# Patient Record
Sex: FEMALE | Race: WHITE | Employment: OTHER | URBAN - NONMETROPOLITAN AREA
[De-identification: names, ages, dates, MRNs, and addresses within clinical notes are randomized per-mention and may not be internally consistent; named-entity substitution may affect disease eponyms.]

---

## 2023-10-05 NOTE — PROGRESS NOTES
[address]  [address] [facility] [address]  Dept: [phone]  Loc: [phone]   Hematology/Oncology Consult (Clinic)        Monica Juarezvale   1965     No ref. provider found   Cheli Small MD     Reason:  breast cancer   Chief Complaint   Patient presents with    New Patient     Encapsulated papillary carcinoma      HPI:  63year old female with recently diagnosed breast cancer presents to the clinic to establish care. Patient underwent left breast lumpectomy without lymph node dissection on 8/22/2023. Pathology showed invasive ductal carcinoma, grade 1, 7 mm in greatest dimension, in association with encapsulated papillary carcinoma and conventional type ductal carcinoma in situ, cribriform and papillary types, low intermediate nuclear grade, margins widely free. ER was 99%, VT 99% and HER2 negative. Patient has been recovering well since surgery however she has noticed redness in the left breast for the last couple weeks. Denies having any pain or tenderness. Does not feel more warm. Denies having other symptoms. Patient had menopause about 4 to 5 years ago. Patient does not want to take any antihormonal pills and wants to try natural methods to decrease her hormones.    -Allergies and medications, past medical history, past surgical history, family history, and social history reviewed. ROS:  14 point system ROS was done and negative except for the positive pertinent history noted in subjective/ HPI. Allergies:   Allergies   Allergen Reactions    Penicillins         Medications:  Current Outpatient Medications   Medication Sig Dispense Refill    furosemide (LASIX) 40 MG tablet Take 1 tablet by mouth daily      losartan (COZAAR) 25 MG tablet Take 0.5 tablets by mouth daily      metoprolol succinate (TOPROL XL) 50 MG extended release tablet Take 1 tablet by mouth daily      potassium chloride (MICRO-K)

## 2023-10-09 ENCOUNTER — OFFICE VISIT (OUTPATIENT)
Dept: ONCOLOGY | Age: 58
End: 2023-10-09
Payer: MEDICAID

## 2023-10-09 ENCOUNTER — HOSPITAL ENCOUNTER (OUTPATIENT)
Dept: INFUSION THERAPY | Age: 58
Discharge: HOME | End: 2023-10-09
Payer: MEDICAID

## 2023-10-09 VITALS
TEMPERATURE: 98.4 F | OXYGEN SATURATION: 96 % | SYSTOLIC BLOOD PRESSURE: 159 MMHG | HEART RATE: 77 BPM | RESPIRATION RATE: 16 BRPM | DIASTOLIC BLOOD PRESSURE: 86 MMHG

## 2023-10-09 VITALS
SYSTOLIC BLOOD PRESSURE: 159 MMHG | RESPIRATION RATE: 16 BRPM | TEMPERATURE: 98.4 F | OXYGEN SATURATION: 96 % | HEART RATE: 77 BPM | DIASTOLIC BLOOD PRESSURE: 86 MMHG

## 2023-10-09 DIAGNOSIS — C50.912 BREAST CANCER, STAGE 1, ESTROGEN RECEPTOR POSITIVE, LEFT (HCC): Primary | ICD-10-CM

## 2023-10-09 DIAGNOSIS — Z17.0 BREAST CANCER, STAGE 1, ESTROGEN RECEPTOR POSITIVE, LEFT (HCC): ICD-10-CM

## 2023-10-09 DIAGNOSIS — C50.912 BREAST CANCER, STAGE 1, ESTROGEN RECEPTOR POSITIVE, LEFT (HCC): ICD-10-CM

## 2023-10-09 DIAGNOSIS — Z17.0 BREAST CANCER, STAGE 1, ESTROGEN RECEPTOR POSITIVE, LEFT (HCC): Primary | ICD-10-CM

## 2023-10-09 LAB
ALBUMIN SERPL BCG-MCNC: 3.7 G/DL (ref 3.5–5.1)
ALP SERPL-CCNC: 202 U/L (ref 38–126)
ALT SERPL W/O P-5'-P-CCNC: 68 U/L (ref 11–66)
ANION GAP SERPL CALC-SCNC: 11 MEQ/L (ref 8–16)
AST SERPL-CCNC: 58 U/L (ref 5–40)
BASOPHILS ABSOLUTE: 0 THOU/MM3 (ref 0–0.1)
BASOPHILS NFR BLD AUTO: 0.2 %
BILIRUB SERPL-MCNC: 0.6 MG/DL (ref 0.3–1.2)
BUN SERPL-MCNC: 14 MG/DL (ref 7–22)
CALCIUM SERPL-MCNC: 9.7 MG/DL (ref 8.5–10.5)
CHLORIDE SERPL-SCNC: 103 MEQ/L (ref 98–111)
CO2 SERPL-SCNC: 26 MEQ/L (ref 23–33)
CREAT SERPL-MCNC: 1 MG/DL (ref 0.4–1.2)
DEPRECATED RDW RBC AUTO: 46.4 FL (ref 35–45)
EOSINOPHIL NFR BLD AUTO: 4.1 %
EOSINOPHILS ABSOLUTE: 0.4 THOU/MM3 (ref 0–0.4)
ERYTHROCYTE [DISTWIDTH] IN BLOOD BY AUTOMATED COUNT: 12.6 % (ref 11.5–14.5)
GFR SERPL CREATININE-BSD FRML MDRD: > 60 ML/MIN/1.73M2
GLUCOSE SERPL-MCNC: 92 MG/DL (ref 70–108)
HCT VFR BLD AUTO: 49 % (ref 37–47)
HGB BLD-MCNC: 15.8 GM/DL (ref 12–16)
IMM GRANULOCYTES # BLD AUTO: 0.04 THOU/MM3 (ref 0–0.07)
IMM GRANULOCYTES NFR BLD AUTO: 0.4 %
LYMPHOCYTES ABSOLUTE: 2.8 THOU/MM3 (ref 1–4.8)
LYMPHOCYTES NFR BLD AUTO: 30.3 %
MCH RBC QN AUTO: 32 PG (ref 26–33)
MCHC RBC AUTO-ENTMCNC: 32.2 GM/DL (ref 32.2–35.5)
MCV RBC AUTO: 99.2 FL (ref 81–99)
MONOCYTES ABSOLUTE: 0.8 THOU/MM3 (ref 0.4–1.3)
MONOCYTES NFR BLD AUTO: 8.4 %
NEUTROPHILS NFR BLD AUTO: 56.6 %
NRBC BLD AUTO-RTO: 0 /100 WBC
PLATELET # BLD AUTO: 326 THOU/MM3 (ref 130–400)
PMV BLD AUTO: 10.5 FL (ref 9.4–12.4)
POTASSIUM SERPL-SCNC: 4.1 MEQ/L (ref 3.5–5.2)
PROT SERPL-MCNC: 7.3 G/DL (ref 6.1–8)
RBC # BLD AUTO: 4.94 MILL/MM3 (ref 4.2–5.4)
SEGMENTED NEUTROPHILS ABSOLUTE COUNT: 5.3 THOU/MM3 (ref 1.8–7.7)
SODIUM SERPL-SCNC: 140 MEQ/L (ref 135–145)
WBC # BLD AUTO: 9.3 THOU/MM3 (ref 4.8–10.8)

## 2023-10-09 PROCEDURE — 80053 COMPREHEN METABOLIC PANEL: CPT

## 2023-10-09 PROCEDURE — 99211 OFF/OP EST MAY X REQ PHY/QHP: CPT

## 2023-10-09 PROCEDURE — 85025 COMPLETE CBC W/AUTO DIFF WBC: CPT

## 2023-10-09 PROCEDURE — 99204 OFFICE O/P NEW MOD 45 MIN: CPT | Performed by: INTERNAL MEDICINE

## 2023-10-09 RX ORDER — METOPROLOL SUCCINATE 50 MG/1
50 TABLET, EXTENDED RELEASE ORAL DAILY
COMMUNITY
Start: 2023-08-07

## 2023-10-09 RX ORDER — ALBUTEROL SULFATE 90 UG/1
AEROSOL, METERED RESPIRATORY (INHALATION)
COMMUNITY
Start: 2023-09-08

## 2023-10-09 RX ORDER — FUROSEMIDE 40 MG/1
40 TABLET ORAL DAILY
COMMUNITY
Start: 2023-08-07

## 2023-10-09 RX ORDER — POTASSIUM CHLORIDE 750 MG/1
10 CAPSULE, EXTENDED RELEASE ORAL 2 TIMES DAILY
COMMUNITY
Start: 2023-02-03

## 2023-10-09 RX ORDER — LOSARTAN POTASSIUM 25 MG/1
12.5 TABLET ORAL DAILY
COMMUNITY
Start: 2023-02-04

## 2023-10-09 NOTE — PATIENT INSTRUCTIONS
Please discuss with surgery again about lymph node biopsy and redness in the breast.   Please see radiation oncology. Genetic testing today.

## 2023-10-31 LAB
Lab: NEGATIVE
Lab: NORMAL

## 2023-11-19 NOTE — PROGRESS NOTES
[address] [facility] [address]  Dept: [phone]  Loc: [phone]   Hematology/Oncology Progress (Clinic)        Agnes Mccabe   1965     No ref. provider found   Andrew Maier MD     Reason:  breast cancer   No chief complaint on file. HPI:  63year old female with recently diagnosed breast cancer presents to the clinic to establish care. Patient underwent left breast lumpectomy without lymph node dissection on 8/22/2023. Pathology showed invasive ductal carcinoma, grade 1, 7 mm in greatest dimension, in association with encapsulated papillary carcinoma and conventional type ductal carcinoma in situ, cribriform and papillary types, low intermediate nuclear grade, margins widely free. ER was 99%, MA 99% and HER2 negative. Patient has been recovering well since surgery however she has noticed redness in the left breast for the last couple weeks. Denies having any pain or tenderness. Does not feel more warm. Denies having other symptoms. Patient had menopause about 4 to 5 years ago. Patient does not want to take any antihormonal pills and wants to try natural methods to decrease her hormones. 11/20/23-  Patient is doing better now. Left breast has improved after completing a course of antibiotics. Denies any redness or swelling anymore. She had sentinel lymph node biopsy done recently which was negative. Denies other symptoms currently.    -Allergies and medications, past medical history, past surgical history, family history, and social history reviewed. ROS:  14 point system ROS was done and negative except for the positive pertinent history noted in subjective/ HPI. Allergies:   Allergies   Allergen Reactions    Penicillins         Medications:  Current Outpatient Medications   Medication Sig Dispense Refill    albuterol sulfate HFA (PROVENTIL;VENTOLIN;PROAIR) 108 (90 Base)

## 2023-11-20 ENCOUNTER — HOSPITAL ENCOUNTER (OUTPATIENT)
Dept: INFUSION THERAPY | Age: 58
Discharge: HOME | End: 2023-11-20
Payer: MEDICAID

## 2023-11-20 ENCOUNTER — OFFICE VISIT (OUTPATIENT)
Dept: ONCOLOGY | Age: 58
End: 2023-11-20
Payer: MEDICAID

## 2023-11-20 VITALS
BODY MASS INDEX: 45.99 KG/M2 | SYSTOLIC BLOOD PRESSURE: 151 MMHG | HEART RATE: 101 BPM | DIASTOLIC BLOOD PRESSURE: 76 MMHG | HEIGHT: 67 IN | OXYGEN SATURATION: 94 % | WEIGHT: 293 LBS | RESPIRATION RATE: 20 BRPM

## 2023-11-20 VITALS
OXYGEN SATURATION: 94 % | RESPIRATION RATE: 20 BRPM | WEIGHT: 293 LBS | HEART RATE: 101 BPM | DIASTOLIC BLOOD PRESSURE: 76 MMHG | HEIGHT: 67 IN | BODY MASS INDEX: 45.99 KG/M2 | SYSTOLIC BLOOD PRESSURE: 151 MMHG

## 2023-11-20 DIAGNOSIS — Z17.0 BREAST CANCER, STAGE 1, ESTROGEN RECEPTOR POSITIVE, LEFT (HCC): Primary | ICD-10-CM

## 2023-11-20 DIAGNOSIS — C50.912 BREAST CANCER, STAGE 1, ESTROGEN RECEPTOR POSITIVE, LEFT (HCC): Primary | ICD-10-CM

## 2023-11-20 PROCEDURE — 99211 OFF/OP EST MAY X REQ PHY/QHP: CPT

## 2023-11-20 PROCEDURE — 99213 OFFICE O/P EST LOW 20 MIN: CPT | Performed by: INTERNAL MEDICINE

## 2023-11-29 ENCOUNTER — INITIAL CONSULT (OUTPATIENT)
Dept: RADIATION ONCOLOGY | Age: 58
End: 2023-11-29

## 2023-11-29 VITALS
WEIGHT: 293 LBS | DIASTOLIC BLOOD PRESSURE: 89 MMHG | SYSTOLIC BLOOD PRESSURE: 178 MMHG | HEART RATE: 100 BPM | BODY MASS INDEX: 47.09 KG/M2 | HEIGHT: 66 IN | OXYGEN SATURATION: 98 % | RESPIRATION RATE: 20 BRPM | TEMPERATURE: 97.2 F

## 2023-11-29 DIAGNOSIS — Z17.0 MALIGNANT NEOPLASM OF UPPER-OUTER QUADRANT OF LEFT BREAST IN FEMALE, ESTROGEN RECEPTOR POSITIVE (HCC): Primary | ICD-10-CM

## 2023-11-29 DIAGNOSIS — C50.412 MALIGNANT NEOPLASM OF UPPER-OUTER QUADRANT OF LEFT BREAST IN FEMALE, ESTROGEN RECEPTOR POSITIVE (HCC): Primary | ICD-10-CM

## 2023-11-29 NOTE — PROGRESS NOTES
pulmonary edema or small airways disease. 3. Left heart enlargement. 4. 2.6 cm nodular density in the left breast.         02/01/2023: CT abdomen pelvis wo contrast:  IMPRESSION:   1. Moderate anasarca is noted in the abdominal wall. Otherwise, no acute   abdominal or pelvic disease. 2.  Mild cardiomegaly. 3.  A few small gallstones are present within the gallbladder. 4.  Nonvisualization of the appendix. 5.  There is advanced degenerative disc disease from L2 through L5.         01/14/2023: CT abdomen pelvis wo contrast:  IMPRESSION:   1. Cholelithiasis. Questionable minimal hazy density adjacent. Could simply represent some motion artifact. Mild inflammatory changes such as in cholecystitis cannot be entirely excluded as the gallbladder is incompletely evaluated by CT. Clinical   correlation suggested with further/follow-up evaluation as clinically indicated. 2. Partially visualized 1.9 cm intermediate density focus associated with the left breast soft tissue. Incompletely evaluated by CT and incompletely visualized. Could simply represent glandular tissue but soft tissue lesion/mass not excluded. Clinically   correlate with any previous workup. Otherwise, consider follow-up/further evaluation with mammography or ultrasound, as clinically indicated. 3. Possible 1.6 cm left adrenal nodule may represent adenoma. Review of Systems   Constitutional:  Negative for activity change, appetite change, fatigue and unexpected weight change. HENT:  Negative for ear pain and trouble swallowing. Eyes:         Hx glacoma   Respiratory:  Positive for shortness of breath (with activity). Negative for cough. Cardiovascular:  Negative for chest pain and leg swelling. Gastrointestinal:  Negative for abdominal pain, blood in stool, diarrhea, nausea and rectal pain. Genitourinary:  Negative for dysuria, frequency, hematuria and urgency.    Musculoskeletal:  Positive for back pain (arthritis joaquina

## 2023-12-01 ENCOUNTER — SOCIAL WORK (OUTPATIENT)
Dept: INFUSION THERAPY | Age: 58
End: 2023-12-01

## 2023-12-01 NOTE — PROGRESS NOTES
Oncology Social Work    Date: 12/1/2023  Time: 2:18 PM  Name: Leah Rosario  MRN: [id number]     Contact Type: Follow-up    Note:   Situation: This staff called Leah Rosario via phone support to introduce myself as her Oncology Social Worker. Background:  Michelle German had her recent appointment at the [facility]. This staff was calling to review if she had any outstanding concerns. Assessment: : This staff had the opportunity to speak with Michelle German. She appeared distracted as she explained that she was driving while we were talking. She asked for gas cards to assist in her opportunity to come to the [address] [place] [address] for her treatment. This staff offered assistance through her Medicaid benefit, however she denied the support, stating she wants to drive herself and just wanted gas assistance. - Education regarding the services provided by the SW were explained during our conversation. I shared the responsibility of utilizing available support services and shared the information from the [employer] [facility], [facility]. This staff encouraged her to contact them for additional support when she has a chance. - No referrals were made at this time since her county has limited cancer resources available for her support. Referral services may be reconsidered should his needs regarding assistance change. Recommendation: Follow-up will be initiated by Michelle eGrman based on need.  provided her with my contact information and will remain available for support.         ALMITA Giles, HEMA, MIROSLAVA  Oncology Social Worker      Electronically signed by ALMITA Giles LSW, ACHP-SW on 12/1/2023 at 2:18 PM

## 2023-12-17 PROBLEM — C50.412 MALIGNANT NEOPLASM OF UPPER-OUTER QUADRANT OF LEFT FEMALE BREAST (HCC): Status: ACTIVE | Noted: 2023-12-17

## 2023-12-22 ENCOUNTER — TELEPHONE (OUTPATIENT)
Dept: SPIRITUAL SERVICES | Facility: CLINIC | Age: 58
End: 2023-12-22

## 2024-01-22 ENCOUNTER — HOSPITAL ENCOUNTER (OUTPATIENT)
Dept: INFUSION THERAPY | Age: 59
Discharge: HOME | End: 2024-01-22
Payer: MEDICAID

## 2024-01-22 ENCOUNTER — OFFICE VISIT (OUTPATIENT)
Dept: ONCOLOGY | Age: 59
End: 2024-01-22
Payer: MEDICAID

## 2024-01-22 VITALS
BODY MASS INDEX: 47.09 KG/M2 | SYSTOLIC BLOOD PRESSURE: 154 MMHG | RESPIRATION RATE: 20 BRPM | OXYGEN SATURATION: 96 % | WEIGHT: 293 LBS | DIASTOLIC BLOOD PRESSURE: 87 MMHG | HEIGHT: 66 IN | HEART RATE: 119 BPM | TEMPERATURE: 97.5 F

## 2024-01-22 VITALS
SYSTOLIC BLOOD PRESSURE: 154 MMHG | RESPIRATION RATE: 20 BRPM | HEART RATE: 119 BPM | DIASTOLIC BLOOD PRESSURE: 87 MMHG | TEMPERATURE: 97.5 F | HEIGHT: 66 IN | BODY MASS INDEX: 47.09 KG/M2 | WEIGHT: 293 LBS | OXYGEN SATURATION: 96 %

## 2024-01-22 DIAGNOSIS — Z78.0 POST-MENOPAUSAL: ICD-10-CM

## 2024-01-22 DIAGNOSIS — Z17.0 BREAST CANCER, STAGE 1, ESTROGEN RECEPTOR POSITIVE, LEFT (HCC): Primary | ICD-10-CM

## 2024-01-22 DIAGNOSIS — C50.912 BREAST CANCER, STAGE 1, ESTROGEN RECEPTOR POSITIVE, LEFT (HCC): Primary | ICD-10-CM

## 2024-01-22 DIAGNOSIS — C50.912 BREAST CANCER, STAGE 1, ESTROGEN RECEPTOR POSITIVE, LEFT (HCC): ICD-10-CM

## 2024-01-22 DIAGNOSIS — Z17.0 BREAST CANCER, STAGE 1, ESTROGEN RECEPTOR POSITIVE, LEFT (HCC): ICD-10-CM

## 2024-01-22 LAB
ALBUMIN SERPL BCG-MCNC: 4.2 G/DL (ref 3.5–5.1)
ALP SERPL-CCNC: 180 U/L (ref 38–126)
ALT SERPL W/O P-5'-P-CCNC: 84 U/L (ref 11–66)
AST SERPL-CCNC: 62 U/L (ref 5–40)
BASOPHILS # BLD AUTO: 0 THOU/MM3 (ref 0–0.1)
BASOPHILS NFR BLD AUTO: 0 % (ref 0–3)
BILIRUB CONJ SERPL-MCNC: < 0.2 MG/DL (ref 0–0.3)
BILIRUB SERPL-MCNC: 0.7 MG/DL (ref 0.3–1.2)
BUN BLDP-MCNC: 14 MG/DL (ref 8–26)
CHLORIDE BLD-SCNC: 108 MEQ/L (ref 98–109)
CREAT BLD-MCNC: 0.9 MG/DL (ref 0.5–1.2)
EOSINOPHIL # BLD AUTO: 0.4 THOU/MM3 (ref 0–0.4)
EOSINOPHIL NFR BLD AUTO: 4 % (ref 0–4)
ERYTHROCYTE [DISTWIDTH] IN BLOOD BY AUTOMATED COUNT: 13.2 % (ref 11.5–14.5)
GFR SERPL CREATININE-BSD FRML MDRD: > 60 ML/MIN/1.73M2
GLUCOSE BLD-MCNC: 87 MG/DL (ref 70–108)
HCT VFR BLD AUTO: 52.5 % (ref 37–47)
HGB BLD-MCNC: 17.1 GM/DL (ref 12–16)
IMM GRANULOCYTES # BLD AUTO: 0.02 THOU/MM3 (ref 0–0.07)
IMM GRANULOCYTES NFR BLD AUTO: 0 %
IONIZED CALCIUM, WHOLE BLOOD: 1.14 MMOL/L (ref 1.12–1.32)
LYMPHOCYTES # BLD AUTO: 2.5 THOU/MM3 (ref 1–4.8)
LYMPHOCYTES NFR BLD AUTO: 24 % (ref 15–47)
MCH RBC QN AUTO: 31.3 PG (ref 26–33)
MCHC RBC AUTO-ENTMCNC: 32.6 GM/DL (ref 32.2–35.5)
MCV RBC AUTO: 96 FL (ref 81–99)
MONOCYTES # BLD AUTO: 0.7 THOU/MM3 (ref 0.4–1.3)
MONOCYTES NFR BLD AUTO: 7 % (ref 0–12)
NEUTROPHILS NFR BLD AUTO: 64 % (ref 43–75)
PLATELET # BLD AUTO: 239 THOU/MM3 (ref 130–400)
PMV BLD AUTO: 11.1 FL (ref 9.4–12.4)
POTASSIUM BLD-SCNC: 4.1 MEQ/L (ref 3.5–4.9)
PROT SERPL-MCNC: 8 G/DL (ref 6.1–8)
RBC # BLD AUTO: 5.46 MILL/MM3 (ref 4.2–5.4)
SEGMENTED NEUTROPHILS ABSOLUTE COUNT: 6.5 THOU/MM3 (ref 1.8–7.7)
SODIUM BLD-SCNC: 144 MEQ/L (ref 138–146)
TOTAL CO2, WHOLE BLOOD: 25 MEQ/L (ref 23–33)
WBC # BLD AUTO: 10.1 THOU/MM3 (ref 4.8–10.8)

## 2024-01-22 PROCEDURE — 99213 OFFICE O/P EST LOW 20 MIN: CPT | Performed by: INTERNAL MEDICINE

## 2024-01-22 PROCEDURE — 80076 HEPATIC FUNCTION PANEL: CPT

## 2024-01-22 PROCEDURE — 99211 OFF/OP EST MAY X REQ PHY/QHP: CPT

## 2024-01-22 PROCEDURE — 80047 BASIC METABLC PNL IONIZED CA: CPT

## 2024-01-22 PROCEDURE — 85025 COMPLETE CBC W/AUTO DIFF WBC: CPT

## 2024-01-22 RX ORDER — LETROZOLE 2.5 MG/1
2.5 TABLET, FILM COATED ORAL DAILY
Qty: 30 TABLET | Refills: 3 | Status: SHIPPED | OUTPATIENT
Start: 2024-01-22

## 2024-01-22 NOTE — PROGRESS NOTES
[facility] [place] [facility]  [facility] MEDICAL [facility]COLOG[facility]  [address]  [address]  [facility] [address]  Dept: [phone]  Loc: [phone]   Hematology/Oncology Progress (Clinic)        Leticia Randall   1965     No ref. provider found   Donald Lin MD     Reason:  breast cancer   Chief Complaint   Patient presents with    Follow-up     Breast cancer, stage 1, estrogen receptor positive, left (HCC)          HPI:  58 year old female with recently diagnosed breast cancer presents to the clinic to establish care.  Patient underwent left breast lumpectomy without lymph node dissection on 8/22/2023.  Pathology showed invasive ductal carcinoma, grade 1, 7 mm in greatest dimension, in association with encapsulated papillary carcinoma and conventional type ductal carcinoma in situ, cribriform and papillary types, low intermediate nuclear grade, margins widely free. ER was 99%, AL 99% and HER2 negative.  Patient has been recovering well since surgery however she has noticed redness in the left breast for the last couple weeks.  Denies having any pain or tenderness.  Does not feel more warm.  Denies having other symptoms.  Patient had menopause about 4 to 5 years ago.  Patient does not want to take any antihormonal pills and wants to try natural methods to decrease her hormones.    11/20/23-  Patient is doing better now.  Left breast has improved after completing a course of antibiotics.  Denies any redness or swelling anymore.  She had sentinel lymph node biopsy done recently which was negative.  Denies other symptoms currently.    1/23/24-   Patient is currently undergoing radiation to the left breast.  She has been tolerating well.  Denies any specific symptoms.  No pain or tenderness in the breast.  She has been eating well.    -Allergies and medications, past medical history, past surgical history, family history, and social history reviewed.    ROS:  14 point system ROS was done

## 2024-01-26 ENCOUNTER — OFFICE VISIT (OUTPATIENT)
Dept: RADIATION ONCOLOGY | Age: 59
End: 2024-01-26

## 2024-01-26 VITALS
RESPIRATION RATE: 18 BRPM | TEMPERATURE: 96.8 F | OXYGEN SATURATION: 96 % | BODY MASS INDEX: 52.13 KG/M2 | HEART RATE: 84 BPM | SYSTOLIC BLOOD PRESSURE: 162 MMHG | DIASTOLIC BLOOD PRESSURE: 92 MMHG | WEIGHT: 293 LBS

## 2024-01-26 DIAGNOSIS — Z17.0 MALIGNANT NEOPLASM OF UPPER-OUTER QUADRANT OF LEFT BREAST IN FEMALE, ESTROGEN RECEPTOR POSITIVE (HCC): Primary | ICD-10-CM

## 2024-01-26 DIAGNOSIS — C50.412 MALIGNANT NEOPLASM OF UPPER-OUTER QUADRANT OF LEFT BREAST IN FEMALE, ESTROGEN RECEPTOR POSITIVE (HCC): Primary | ICD-10-CM

## 2024-01-26 NOTE — PROGRESS NOTES
[facility]          Radiation Oncology     [address] [place] [address]  Phone: [phone] - Option 2  Fax:[phone]               Dr. Jay Smith MD MS        Dr. Kendra Coon PhD MD       On Treatment Visit Note (OTV)    Date of Service: 2024  Patient ID: Leticia Randall   : 1965  MRN: [id number]   Acct Number:        RADIATION ONCOLOGY ATTENDING:  Kendra Coon PhD MD    DIAGNOSIS:   Cancer Staging   Malignant neoplasm of upper-outer quadrant of left female breast (HCC)  Staging form: Breast, AJCC 8th Edition  - Pathologic stage from 2023: Stage IA (pT1b, pN0(sn), cM0, G1, ER+, MS+, HER2-) - Signed by Kendra Coon MD on 2023  Staging comments: Extensive intraductal component: 2.5 cm low to intermediate grade DCIS with 7mm invasive coimponent      Treatment Area: Breast     Current Total Dose(cGy): 1068  Current Fraction: 4/15  Final/Cumulative Rx. Dose (cGy): 4005    Patient was seen today for weekly visit.     Wt Readings from Last 3 Encounters:   24 (!) 146.5 kg (322 lb 15.6 oz)   24 (!) 146.8 kg (323 lb 11.2 oz)   24 (!) 146.8 kg (323 lb 11.2 oz)       BP (!) 162/92 (Site: Right Lower Arm, Position: Sitting)   Pulse 84   Temp 96.8 °F (36 °C) (Infrared)   Resp 18   Wt (!) 146.5 kg (322 lb 15.6 oz)   SpO2 96%   BMI 52.13 kg/m²     Lab Results   Component Value Date    WBC 10.1 2024    HGB 17.1 (H) 2024    HCT 52.5 (H) 2024     2024       Comfort Alteration  Fatigue:Able to perform daily activities with rest periods    Pain Location: no breast pain Right knee 7/10  Pain Intensity (Current): 0 No Pain  Pain Treatment: N/A  Pain Relief: n/a  Hot Flashes/Flushes: None    Emotional Alteration:   Coping: effective    Nutritional Alteration  Anorexia: none   Nausea: No nausea noted-occasional in am ???

## 2024-01-30 ENCOUNTER — OFFICE VISIT (OUTPATIENT)
Dept: RADIATION ONCOLOGY | Age: 59
End: 2024-01-30

## 2024-01-30 VITALS
DIASTOLIC BLOOD PRESSURE: 88 MMHG | WEIGHT: 293 LBS | RESPIRATION RATE: 20 BRPM | TEMPERATURE: 98 F | HEART RATE: 100 BPM | SYSTOLIC BLOOD PRESSURE: 164 MMHG | OXYGEN SATURATION: 95 % | BODY MASS INDEX: 52.34 KG/M2

## 2024-01-30 DIAGNOSIS — C50.412 MALIGNANT NEOPLASM OF UPPER-OUTER QUADRANT OF LEFT BREAST IN FEMALE, ESTROGEN RECEPTOR POSITIVE (HCC): Primary | ICD-10-CM

## 2024-01-30 DIAGNOSIS — Z17.0 MALIGNANT NEOPLASM OF UPPER-OUTER QUADRANT OF LEFT BREAST IN FEMALE, ESTROGEN RECEPTOR POSITIVE (HCC): Primary | ICD-10-CM

## 2024-01-30 NOTE — PROGRESS NOTES
[facility]          Radiation Oncology     [address] [place] [address]  Phone: [phone] - Option 2  Fax:[phone]               Dr. Jay Smith MD MS        Dr. Kendra Coon PhD MD       On Treatment Visit Note (OTV)    Date of Service: 2024  Patient ID: Leticia Randall   : 1965  MRN: [id number]   Acct Number:        RADIATION ONCOLOGY ATTENDING:  Kendra Coon PhD MD    DIAGNOSIS:   Cancer Staging   Malignant neoplasm of upper-outer quadrant of left female breast (HCC)  Staging form: Breast, AJCC 8th Edition  - Pathologic stage from 2023: Stage IA (pT1b, pN0(sn), cM0, G1, ER+, MO+, HER2-) - Signed by Kendra Coon MD on 2023  Staging comments: Extensive intraductal component: 2.5 cm low to intermediate grade DCIS with 7mm invasive coimponent      Treatment Area: Breast     Current Total Dose(cGy): 1602  Current Fraction: 6/15  Final/Cumulative Rx. Dose (cGy): 4005    Patient was seen today for weekly visit.     Wt Readings from Last 3 Encounters:   24 (!) 147.1 kg (324 lb 4.8 oz)   24 (!) 146.5 kg (322 lb 15.6 oz)   24 (!) 146.8 kg (323 lb 11.2 oz)       BP (!) 164/88 (Site: Left Lower Arm, Position: Sitting)   Pulse 100   Temp 98 °F (36.7 °C) (Infrared)   Resp 20   Wt (!) 147.1 kg (324 lb 4.8 oz)   SpO2 95%   BMI 52.34 kg/m²     Lab Results   Component Value Date    WBC 10.1 2024    HGB 17.1 (H) 2024    HCT 52.5 (H) 2024     2024       Comfort Alteration  Fatigue:Able to perform daily activities with rest periods    Pain Location: no breast pain Right knee 7/10  Pain Intensity (Current): 0 No Pain- tender nipple  Pain Treatment: N/A  Pain Relief: n/a  Hot Flashes/Flushes: None    Emotional Alteration:   Coping: effective    Nutritional Alteration  Anorexia: none   Nausea: No nausea

## 2024-02-06 ENCOUNTER — OFFICE VISIT (OUTPATIENT)
Dept: RADIATION ONCOLOGY | Age: 59
End: 2024-02-06

## 2024-02-06 VITALS
TEMPERATURE: 98 F | BODY MASS INDEX: 52.09 KG/M2 | WEIGHT: 293 LBS | RESPIRATION RATE: 20 BRPM | HEART RATE: 79 BPM | SYSTOLIC BLOOD PRESSURE: 167 MMHG | DIASTOLIC BLOOD PRESSURE: 91 MMHG | OXYGEN SATURATION: 98 %

## 2024-02-06 DIAGNOSIS — C50.412 MALIGNANT NEOPLASM OF UPPER-OUTER QUADRANT OF LEFT BREAST IN FEMALE, ESTROGEN RECEPTOR POSITIVE (HCC): Primary | ICD-10-CM

## 2024-02-06 DIAGNOSIS — Z17.0 MALIGNANT NEOPLASM OF UPPER-OUTER QUADRANT OF LEFT BREAST IN FEMALE, ESTROGEN RECEPTOR POSITIVE (HCC): Primary | ICD-10-CM

## 2024-02-06 NOTE — PROGRESS NOTES
throughout, thin strip of moderate erythema along IM fold.  Recommend skin care 3 x daily with increased attention to IM fold.    New medications, diagnostic results: Not applicable    PLAN: Again reviewed potential side effects of radiation for the patient's treatment.    Continue local/topical care as above. Add Hydrocortisone cream to skin care.     Use clotrimazole cream if tinea develops.   Continue current radiation course as prescribed.

## 2024-02-13 ENCOUNTER — OFFICE VISIT (OUTPATIENT)
Dept: RADIATION ONCOLOGY | Age: 59
End: 2024-02-13

## 2024-02-13 VITALS
HEART RATE: 88 BPM | TEMPERATURE: 97.2 F | BODY MASS INDEX: 52.02 KG/M2 | RESPIRATION RATE: 20 BRPM | DIASTOLIC BLOOD PRESSURE: 88 MMHG | WEIGHT: 293 LBS | OXYGEN SATURATION: 95 % | SYSTOLIC BLOOD PRESSURE: 166 MMHG

## 2024-02-13 DIAGNOSIS — C50.412 MALIGNANT NEOPLASM OF UPPER-OUTER QUADRANT OF LEFT BREAST IN FEMALE, ESTROGEN RECEPTOR POSITIVE (HCC): Primary | ICD-10-CM

## 2024-02-13 DIAGNOSIS — Z17.0 MALIGNANT NEOPLASM OF UPPER-OUTER QUADRANT OF LEFT BREAST IN FEMALE, ESTROGEN RECEPTOR POSITIVE (HCC): Primary | ICD-10-CM

## 2024-02-13 NOTE — PROGRESS NOTES
[facility]          Radiation Oncology     [address] [place] [address]  Phone: [phone] - Option 2  Fax:[phone]               Dr. Jay Smith MD MS        Dr. Kendra Coon PhD MD       On Treatment Visit Note (OTV)    Date of Service: 2024  Patient ID: Leticia Randall   : 1965  MRN: [id number]   Acct Number:        RADIATION ONCOLOGY ATTENDING:  Kendra Coon PhD MD    DIAGNOSIS:   Cancer Staging   Malignant neoplasm of upper-outer quadrant of left female breast (HCC)  Staging form: Breast, AJCC 8th Edition  - Pathologic stage from 2023: Stage IA (pT1b, pN0(sn), cM0, G1, ER+, OR+, HER2-) - Signed by Kendra Coon MD on 2023  Staging comments: Extensive intraductal component: 2.5 cm low to intermediate grade DCIS with 7mm invasive coimponent      Treatment Area: Breast     Current Total Dose(cGy): 4005  Current Fraction:15/15  Final/Cumulative Rx. Dose (cGy): 4005    Patient was seen today for weekly visit.     Wt Readings from Last 3 Encounters:   24 (!) 146.2 kg (322 lb 5 oz)   24 (!) 146.4 kg (322 lb 12.1 oz)   24 (!) 147.1 kg (324 lb 4.8 oz)       BP (!) 166/88   Pulse 88   Temp 97.2 °F (36.2 °C) (Infrared)   Resp 20   Wt (!) 146.2 kg (322 lb 5 oz)   SpO2 95%   BMI 52.02 kg/m²     Lab Results   Component Value Date    WBC 10.1 2024    HGB 17.1 (H) 2024    HCT 52.5 (H) 2024     2024       Comfort Alteration  Fatigue:Able to perform daily activities with rest periods    Pain Location: no breast pain Right knee 4/10  Pain Intensity (Current): 0 No Pain- tender nipple  Pain Treatment: N/A  Pain Relief: n/a  Hot Flashes/Flushes: None    Emotional Alteration:   Coping: effective    Nutritional Alteration  Anorexia: none   Nausea: No nausea noted-occasional in am ??? Wait to long to eat  Vomiting: No

## 2024-02-20 ENCOUNTER — CLINICAL DOCUMENTATION (OUTPATIENT)
Dept: RADIATION ONCOLOGY | Age: 59
End: 2024-02-20

## 2024-02-27 NOTE — PROGRESS NOTES
[facility]           Radiation Oncology     [address] [place] [address]  Phone: [phone] - Option 2  Fax:[phone]               RADIATION THERAPY TREATMENT SUMMARY      Patient ID: Leticia Randall   : 1965  MRN: [id number]        PATIENT: Leticia Randall  : 1965  MRN: [id number]  DATE: 2024      DIAGNOSIS: C50.412 -- Malignant neoplasm of upper outer quadrant left female breast; Invasive ductal carcinoma, Grade 1 with extensive intraductal component; pT1b pN0(sn) M0, Stage IA; ER+(99%, strong); [place]+(99%, strong); Her2- [2.5 cm DCIS with 7mm invasive component]  Date of diagnosis: 3/29/2023      Treatment Course Number: 1    Treatment Site (s) Modality Dose (cGy) From To Fractions/  Elapsed Days   Left Breast 6MV photons 4005 cGy 2024 2024 15/ 22     Cumulative Dose to Left Breast: 4005 cGy  Treatment Modifiers: Intensity modulated radiation therapy; custom MLC blocking; custom vac fix immobilization; Daily cone beam CT guidance      HISTORY OF PRESENT ILLNESS:   Leticia Randall was initially seen in radiation oncology as a 58-year-old woman whose history of present illness dates back to 2023 when she had a CT angiography of the chest to rule out pulmonary embolism after presenting with increased dyspnea.  She did not have evidence of PE, but the scan unexpectedly showed a suspicious density in the left breast.  She subsequently underwent mammography and ultrasound confirming the presence of a suspicious lesion in the upper outer quadrant left breast.  Biopsy in late 2023 was read as favoring encapsulated carcinoma (DCIS.  The neoplasm was ER positive [place] positive HER-2/berenice negative.  Excisional biopsy was recommended and was performed in 2023.  Final pathology showed a 2.5 cm low to intermediate grade DCIS with a 7 mm grade 1 invasive ductal

## 2024-10-04 ENCOUNTER — TELEPHONE (OUTPATIENT)
Dept: BARIATRICS/WEIGHT MGMT | Age: 59
End: 2024-10-04

## 2024-10-04 NOTE — TELEPHONE ENCOUNTER
Referral received - pt must be nicotine free for 90 days get a nicotine test from PCP -if that is negative -we would need that negative nicotine result faxed to our office and we would call her back with  next steps.  PCP office notified .    Call to Dr. Lin office -with above message.[phone]-sejsw with   Jeremy Blount on 10-4-2024.

## 2025-01-06 NOTE — TELEPHONE ENCOUNTER
For this encounter, please see note attached to flowsheet.     JYOTI AlstonDiv     Spiritual Care Department  [address] [place] [address] [phone]